# Patient Record
Sex: FEMALE | Race: WHITE | NOT HISPANIC OR LATINO | Employment: FULL TIME | URBAN - METROPOLITAN AREA
[De-identification: names, ages, dates, MRNs, and addresses within clinical notes are randomized per-mention and may not be internally consistent; named-entity substitution may affect disease eponyms.]

---

## 2018-09-06 ENCOUNTER — TRANSCRIBE ORDERS (OUTPATIENT)
Dept: ADMINISTRATIVE | Facility: HOSPITAL | Age: 23
End: 2018-09-06

## 2018-09-06 DIAGNOSIS — R51.9 NONINTRACTABLE HEADACHE, UNSPECIFIED CHRONICITY PATTERN, UNSPECIFIED HEADACHE TYPE: Primary | ICD-10-CM

## 2018-10-12 ENCOUNTER — HOSPITAL ENCOUNTER (OUTPATIENT)
Dept: GENERAL RADIOLOGY | Facility: HOSPITAL | Age: 23
Discharge: HOME | End: 2018-10-12

## 2018-10-12 ENCOUNTER — HOSPITAL ENCOUNTER (OUTPATIENT)
Dept: MRI IMAGING | Facility: HOSPITAL | Age: 23
Discharge: HOME | End: 2018-10-12
Admitting: NURSE PRACTITIONER

## 2018-10-12 VITALS
WEIGHT: 275 LBS | TEMPERATURE: 98.1 F | RESPIRATION RATE: 18 BRPM | BODY MASS INDEX: 43.16 KG/M2 | OXYGEN SATURATION: 100 % | DIASTOLIC BLOOD PRESSURE: 79 MMHG | HEIGHT: 67 IN | HEART RATE: 82 BPM | SYSTOLIC BLOOD PRESSURE: 126 MMHG

## 2018-10-12 DIAGNOSIS — R51.9 NONINTRACTABLE HEADACHE, UNSPECIFIED CHRONICITY PATTERN, UNSPECIFIED HEADACHE TYPE: ICD-10-CM

## 2018-10-12 LAB
APPEARANCE CSF: CLEAR
B-HCG UR QL: NEGATIVE
COLOR CSF: COLORLESS
GLUCOSE CSF-MCNC: 55 MG/DL (ref 40–70)
PROT CSF-MCNC: 34 MG/DL (ref 15–45)
RBC # CSF MANUAL: 1 /MM3 (ref 0–5)
SPECIMEN VOL CSF: 8.5 ML
TUBE # CSF: 3
WBC # CSF MANUAL: 0 /MM3 (ref 0–5)

## 2018-10-12 PROCEDURE — 84157 ASSAY OF PROTEIN OTHER: CPT | Performed by: NURSE PRACTITIONER

## 2018-10-12 PROCEDURE — 81025 URINE PREGNANCY TEST: CPT | Performed by: PHYSICIAN ASSISTANT

## 2018-10-12 PROCEDURE — A9577 INJ MULTIHANCE: HCPCS | Performed by: NURSE PRACTITIONER

## 2018-10-12 PROCEDURE — 89050 BODY FLUID CELL COUNT: CPT | Performed by: NURSE PRACTITIONER

## 2018-10-12 PROCEDURE — 87205 SMEAR GRAM STAIN: CPT | Performed by: NURSE PRACTITIONER

## 2018-10-12 PROCEDURE — 77003 FLUOROGUIDE FOR SPINE INJECT: CPT

## 2018-10-12 PROCEDURE — 70553 MRI BRAIN STEM W/O & W/DYE: CPT

## 2018-10-12 PROCEDURE — 87015 SPECIMEN INFECT AGNT CONCNTJ: CPT | Performed by: NURSE PRACTITIONER

## 2018-10-12 PROCEDURE — 87070 CULTURE OTHR SPECIMN AEROBIC: CPT | Performed by: NURSE PRACTITIONER

## 2018-10-12 PROCEDURE — 0 GADOBENATE DIMEGLUMINE 529 MG/ML SOLUTION: Performed by: NURSE PRACTITIONER

## 2018-10-12 PROCEDURE — 82945 GLUCOSE OTHER FLUID: CPT | Performed by: NURSE PRACTITIONER

## 2018-10-12 RX ORDER — LIDOCAINE HYDROCHLORIDE 10 MG/ML
5 INJECTION, SOLUTION INFILTRATION; PERINEURAL ONCE
Status: COMPLETED | OUTPATIENT
Start: 2018-10-12 | End: 2018-10-12

## 2018-10-12 RX ADMIN — LIDOCAINE HYDROCHLORIDE 5 ML: 10 INJECTION, SOLUTION INFILTRATION; PERINEURAL at 11:46

## 2018-10-12 RX ADMIN — GADOBENATE DIMEGLUMINE 20 ML: 529 INJECTION, SOLUTION INTRAVENOUS at 08:11

## 2018-10-12 NOTE — NURSING NOTE
Pt discharged to home s/p LP.  Pt tolerated procedure without complications.  Discharge instructions reviewed with patient and boyfriend both verbalized understanding.  Pt transported to exit via wheelchair per CNA.

## 2018-10-12 NOTE — POST-PROCEDURE NOTE
Radiology Procedure    Pre-procedure: procedure, risks discussed with patient. Patient indicated understanding and consented to procedure.     Procedure Performed: lumbar puncture     IV Sedation and/or Anesthesia:  No    Complications: none    Preliminary Findings: opening pressure 15cm H2O    Specimen Removed: 8cc clear, colorless CSF    Estimated Blood Loss:  0ml    Post-Procedure Diagnosis: pending    Post-Procedure Plan: encourage fluids, bed rest x 2 hours    Standard Discharge Instructions Given:yes     EFRAIN Newsome  10/12/18  11:40 AM

## 2018-10-15 ENCOUNTER — TELEPHONE (OUTPATIENT)
Dept: INFUSION THERAPY | Facility: HOSPITAL | Age: 23
End: 2018-10-15

## 2018-10-18 ENCOUNTER — HOSPITAL ENCOUNTER (EMERGENCY)
Facility: HOSPITAL | Age: 23
Discharge: HOME | End: 2018-10-18
Attending: EMERGENCY MEDICINE | Admitting: EMERGENCY MEDICINE

## 2018-10-18 VITALS
SYSTOLIC BLOOD PRESSURE: 115 MMHG | HEIGHT: 67 IN | HEART RATE: 89 BPM | TEMPERATURE: 98.1 F | RESPIRATION RATE: 16 BRPM | DIASTOLIC BLOOD PRESSURE: 77 MMHG | BODY MASS INDEX: 40.81 KG/M2 | WEIGHT: 260 LBS | OXYGEN SATURATION: 100 %

## 2018-10-18 DIAGNOSIS — R51.9 NONINTRACTABLE HEADACHE, UNSPECIFIED CHRONICITY PATTERN, UNSPECIFIED HEADACHE TYPE: Primary | ICD-10-CM

## 2018-10-18 DIAGNOSIS — Z98.890: ICD-10-CM

## 2018-10-18 LAB
ALBUMIN SERPL-MCNC: 4.41 G/DL (ref 3.2–4.8)
ALBUMIN/GLOB SERPL: 1.4 G/DL (ref 1.5–2.5)
ALP SERPL-CCNC: 103 U/L (ref 25–100)
ALT SERPL W P-5'-P-CCNC: 16 U/L (ref 7–40)
ANION GAP SERPL CALCULATED.3IONS-SCNC: 8 MMOL/L (ref 3–11)
AST SERPL-CCNC: 21 U/L (ref 0–33)
BASOPHILS # BLD AUTO: 0.04 10*3/MM3 (ref 0–0.2)
BASOPHILS NFR BLD AUTO: 0.4 % (ref 0–1)
BILIRUB SERPL-MCNC: 0.7 MG/DL (ref 0.3–1.2)
BUN BLD-MCNC: 9 MG/DL (ref 9–23)
BUN/CREAT SERPL: 10.6 (ref 7–25)
CALCIUM SPEC-SCNC: 9.2 MG/DL (ref 8.7–10.4)
CHLORIDE SERPL-SCNC: 107 MMOL/L (ref 99–109)
CO2 SERPL-SCNC: 19 MMOL/L (ref 20–31)
CREAT BLD-MCNC: 0.85 MG/DL (ref 0.6–1.3)
DEPRECATED RDW RBC AUTO: 37.9 FL (ref 37–54)
EOSINOPHIL # BLD AUTO: 0.3 10*3/MM3 (ref 0–0.3)
EOSINOPHIL NFR BLD AUTO: 2.7 % (ref 0–3)
ERYTHROCYTE [DISTWIDTH] IN BLOOD BY AUTOMATED COUNT: 12.6 % (ref 11.3–14.5)
GFR SERPL CREATININE-BSD FRML MDRD: 84 ML/MIN/1.73
GLOBULIN UR ELPH-MCNC: 3.1 GM/DL
GLUCOSE BLD-MCNC: 79 MG/DL (ref 70–100)
HCT VFR BLD AUTO: 45.5 % (ref 34.5–44)
HGB BLD-MCNC: 15 G/DL (ref 11.5–15.5)
IMM GRANULOCYTES # BLD: 0.03 10*3/MM3 (ref 0–0.03)
IMM GRANULOCYTES NFR BLD: 0.3 % (ref 0–0.6)
LYMPHOCYTES # BLD AUTO: 2.92 10*3/MM3 (ref 0.6–4.8)
LYMPHOCYTES NFR BLD AUTO: 26.6 % (ref 24–44)
MCH RBC QN AUTO: 27.7 PG (ref 27–31)
MCHC RBC AUTO-ENTMCNC: 33 G/DL (ref 32–36)
MCV RBC AUTO: 84.1 FL (ref 80–99)
MONOCYTES # BLD AUTO: 0.66 10*3/MM3 (ref 0–1)
MONOCYTES NFR BLD AUTO: 6 % (ref 0–12)
NEUTROPHILS # BLD AUTO: 7.04 10*3/MM3 (ref 1.5–8.3)
NEUTROPHILS NFR BLD AUTO: 64.3 % (ref 41–71)
PLATELET # BLD AUTO: 280 10*3/MM3 (ref 150–450)
PMV BLD AUTO: 11 FL (ref 6–12)
POTASSIUM BLD-SCNC: 3.8 MMOL/L (ref 3.5–5.5)
PROT SERPL-MCNC: 7.5 G/DL (ref 5.7–8.2)
RBC # BLD AUTO: 5.41 10*6/MM3 (ref 3.89–5.14)
SODIUM BLD-SCNC: 134 MMOL/L (ref 132–146)
WBC NRBC COR # BLD: 10.96 10*3/MM3 (ref 4.5–13.5)

## 2018-10-18 PROCEDURE — 25010000002 ONDANSETRON PER 1 MG: Performed by: EMERGENCY MEDICINE

## 2018-10-18 PROCEDURE — 25010000002 DEXAMETHASONE: Performed by: EMERGENCY MEDICINE

## 2018-10-18 PROCEDURE — 96375 TX/PRO/DX INJ NEW DRUG ADDON: CPT

## 2018-10-18 PROCEDURE — 96365 THER/PROPH/DIAG IV INF INIT: CPT

## 2018-10-18 PROCEDURE — 85025 COMPLETE CBC W/AUTO DIFF WBC: CPT | Performed by: EMERGENCY MEDICINE

## 2018-10-18 PROCEDURE — 99284 EMERGENCY DEPT VISIT MOD MDM: CPT

## 2018-10-18 PROCEDURE — 80053 COMPREHEN METABOLIC PANEL: CPT | Performed by: EMERGENCY MEDICINE

## 2018-10-18 RX ORDER — ACETAZOLAMIDE 250 MG/1
500 TABLET ORAL 2 TIMES DAILY
COMMUNITY

## 2018-10-18 RX ORDER — ONDANSETRON 2 MG/ML
4 INJECTION INTRAMUSCULAR; INTRAVENOUS ONCE
Status: COMPLETED | OUTPATIENT
Start: 2018-10-18 | End: 2018-10-18

## 2018-10-18 RX ORDER — ACETAZOLAMIDE 250 MG/1
500 TABLET ORAL ONCE
Status: COMPLETED | OUTPATIENT
Start: 2018-10-18 | End: 2018-10-18

## 2018-10-18 RX ORDER — ONDANSETRON 2 MG/ML
4 INJECTION INTRAMUSCULAR; INTRAVENOUS
Status: DISCONTINUED | OUTPATIENT
Start: 2018-10-18 | End: 2018-10-18 | Stop reason: HOSPADM

## 2018-10-18 RX ADMIN — ACETAZOLAMIDE 500 MG: 250 TABLET ORAL at 20:16

## 2018-10-18 RX ADMIN — DEXAMETHASONE SODIUM PHOSPHATE 10 MG: 10 INJECTION INTRAMUSCULAR; INTRAVENOUS at 18:53

## 2018-10-18 RX ADMIN — SODIUM CHLORIDE 500 ML: 9 INJECTION, SOLUTION INTRAVENOUS at 18:50

## 2018-10-18 RX ADMIN — ONDANSETRON HYDROCHLORIDE 4 MG: 2 INJECTION, SOLUTION INTRAMUSCULAR; INTRAVENOUS at 18:49

## 2018-10-18 NOTE — ED PROVIDER NOTES
[facility] EMERGENCY DEPARTMENT    eMERGENCY dEPARTMENT eNCOUnter      Pt Name: Papa Valero  MRN: [id number]  YOB: 1995  Date of evaluation: 10/18/2018  Provider: Maksim Quintero DO    CHIEF COMPLAINT       Chief Complaint   Patient presents with   • Headache         HISTORY OF PRESENT ILLNESS  (Location/Symptom, Timing/Onset, Context/Setting, Quality, Duration, Modifying Factors, Severity.)   Papa Valero is a 23 y.o. female who presents to the emergency department for evaluation of a headache which she rates has been on and off for last couple weeks.  Last Friday she was evaluated for her headaches and vision changes, all totally found to have swelling of her optic disks, concern for increased intracranial pressure.  That time she did undergo imaging including MRI of the brain in addition to lumbar puncture.  She is initially her symptoms did improve, headache lessened the next day but returned the following morning, states it was a gradual onset, mainly located in the left parietal temporal region.  Denies any vision changes or vision loss.  No fevers or chills, no neck pain or stiffness.  She has been in discussion with her neurologist Dr. Schulte yesterday he started her on Diamox.  She continues with her symptoms told to come the hospital for reevaluation.  Denies any numbness or tingling in her upper extremities, negative unilateral weakness.  No prior history of lumbar punctures.  No seizure activity.  No other acute systemic complaints.      Nursing notes were reviewed.    REVIEW OF SYSTEMS    (2-9 systems for level 4, 10 or more for level 5)   ROS:  General:  No fevers, no chills, no weakness  Cardiovascular:  No chest pain, no palpitations  Respiratory:  No shortness of breath, no cough, no wheezing  Gastrointestinal:  No pain, no nausea, no vomiting, no diarrhea  Musculoskeletal:  No muscle pain, no joint pain  Skin:  No rash, no easy bruising  Neurologic:  No speech  problems, + headache, no extremity numbness, no extremity tingling, no extremity weakness  Psychiatric:  No anxiety  Genitourinary:  No dysuria, no hematuria    Except as noted above the remainder of the review of systems was reviewed and negative.       PAST MEDICAL HISTORY     Past Medical History:   Diagnosis Date   • Asthma    • History of IBS    • Hypertension     when younger   • Tachycardia          SURGICAL HISTORY     History reviewed. No pertinent surgical history.      CURRENT MEDICATIONS       Current Facility-Administered Medications:   •  acetaZOLAMIDE (DIAMOX) tablet 500 mg, 500 mg, Oral, Once, Maksim Quintero DO  •  ondansetron (ZOFRAN) injection 4 mg, 4 mg, Intravenous, Q30 Min PRN, Maksim Quintero DO    Current Outpatient Prescriptions:   •  acetaZOLAMIDE (DIAMOX) 250 MG tablet, Take 500 mg by mouth 2 (Two) Times a Day., Disp: , Rfl:     ALLERGIES     Shellfish-derived products and Keflex [cephalexin]    FAMILY HISTORY     History reviewed. No pertinent family history.       SOCIAL HISTORY       Social History     Social History   • Marital status: Single     Social History Main Topics   • Smoking status: Never Smoker   • Alcohol use No   • Drug use: No   • Sexual activity: Yes     Birth control/ protection: IUD     Other Topics Concern   • Not on file         PHYSICAL EXAM    (up to 7 for level 4, 8 or more for level 5)     Vitals:    10/18/18 1814 10/18/18 1830 10/18/18 1900 10/18/18 1913   BP:  118/83 115/82    BP Location:       Patient Position:       Pulse:       Resp:       Temp:       TempSrc:       SpO2: 96% 98% 99% 100%   Weight:       Height:           Physical Exam  General :Patient is awake, alert, oriented, in no acute distress, nontoxic appearing  HEENT: Pupils are equally round and reactive to light, EOMI, conjunctivae clear, sclerae white, there is no injection no icterus.  Oral mucosa is moist, no exudate. Uvula is midline  Neck: Neck is supple, full range of motion,  trachea midline  Cardiac: Heart regular rate, rhythm, no murmurs, rubs, or gallops  Lungs: Lungs are clear to auscultation, there is no wheezing, rhonchi, or rales. There is no use of accessory muscles.  Chest wall: There is no tenderness to palpation over the chest wall or over ribs  Abdomen: Abdomen is soft, nontender, nondistended. There is no firm or pulsatile masses, no rebound rigidity or guarding.   Musculoskeletal: 5 out of 5 strength in all 4 extremities.  No focal muscle deficits are appreciated  Neuro: GCS 15 of focal neurological deficit, normal finger-to-nose, good  strength, no pronator drift.  Normal sensation upper and lower extremities.  Good strength in bilateral lowers.  Motor intact, sensory intact, level of consciousness is normal, cerebellar function is normal, reflexes are grossly normal.   Dermatology: Skin is warm and dry  Psych: Mentation is grossly normal, cognition is grossly normal. Affect is appropriate.      DIAGNOSTIC RESULTS     EKG: All EKG's are interpreted by the Emergency Department Physician who either signs or Co-signs this chart in the absence of a cardiologist.    No orders to display       RADIOLOGY:   Non-plain film images such as CT, Ultrasound and MRI are read by the radiologist. Plain radiographic images are visualized and preliminarily interpreted by the emergency physician with the below findings:      [] Radiologist's Report Reviewed:  No orders to display    MRI Brain With & Without Contrast [SDS692] (Order [id number])   Order   Status: Final result   Patient Location     Patient Class Location Department Phone   Emergency [facility] EMERGENCY DEPT, 05, 05 [phone]   Study Notes        Demetrio Gomez on 10/12/2018  8:10 AM   Headaches , optic nerve swelling left eye ,vision changes ,increased pressure feeling in head   20 ml multihance      Appointment Information     Display Notes     --BC           PACS Images     Radiology Images   Study Result      EXAMINATION: MRI BRAIN W WO CONTRAST- 10/12/2018     INDICATION: R51-Headache      TECHNIQUE: Multiplanar MRI of the brain with and without intravenous  contrast administration     COMPARISON: NONE     FINDINGS: No restriction on diffusion-weighted sequences. Midline  structures are symmetric without evidence of mass, mass effect or  midline shift. Pituitary and sella within normal limits for patient age  with slightly upper convex margin however grossly normal in size.  Cervicomedullary junction widely patent. No significant white matter  signal aberration within the periventricular and deep white matter.  Basal cisterns are patent.     Left globe demonstrate a questionably flattened/blunted appearance  posteriorly near the optic disc seen on T2 sequences along with edema of  the left optic nerve however no discrete abnormal enhancement  associated. Ventricles and sulci are within normal limits for patient  age.  Paranasal sinuses demonstrate mucus retention cyst within the left  maxillary sinus as well as mucosal thickening and mild edema right  sphenoid sinus.     Postcontrast administration sequences without abnormal enhancement of  the supratentorial white matter. Superior sagittal sinus widely patent.     IMPRESSION:  Edema of the left optic nerve and surrounding perineural fat  with questionable blunting of the left globe posterior margin at the  optic disc left greater than right which may represent components of  optic nerve edema, neuritis and/or increased intracranial pressures. If  symptoms persist and/or progress lumbar puncture with opening pressure  may be useful for further evaluation.      Minimal mucosal thickening and edema of the right sphenoid sinus may  represent component of sinusitis.     D:  10/12/2018  E:  10/12/2018     This report was finalized on 10/12/2018 3:20 PM by Dr. Richar Chadwick.   IR Lumbar Puncture Diagnosis [[id number]] (Order [id number])   Order   Status: Final result   Patient  Location     Patient Class Location Department Phone   Emergency [facility] EMERGENCY DEPT, 05, 05 [phone]   Study Notes        Bonny Rolle RT on 10/12/2018 11:45 AM   headache      Appointment Information     Display Notes     ~           PACS Images     Radiology Images   Study Result     EXAMINATION: IR LUMBAR PUNCTURE DIAG/THERA-     INDICATION: R51 HEADACHE; R51-Headache     TECHNIQUE: 4 seconds of fluoroscopic time was used for this procedure. 1  associated image was saved. Procedure, risks, complications, and side  effects of lumbar puncture were discussed and reviewed in detail with  patient including the possibility for eating, infection, needle damage  to surrounding structures, the possibility of a CSF leak potentially  requiring a blood patch. The patient indicated understanding and  consented to the procedure.     The patient was placed in the prone position on the table. The back was  prepped and draped in a sterile fashion. 1% lidocaine was used as a  local anesthetic to the skin and subcutaneous tissues. Under  fluoroscopic guidance a 22-gauge spinal needle was advanced at the L2-L3  level to the CSF space. Opening pressure was measured and recorded at 15  cm H2O approximately 8 cc of clear and colorless CSF was returned and  collected in 4 numbered vials. Sample vials were labeled and sent to  pathology for further analysis. The needle was removed. The patient  tolerated the procedure well, and no immediate complications occurred.     COMPARISON: NONE     FINDINGS: Opening pressure was 15 cm H2O.        IMPRESSION:  Successful fluoroscopic guided lumbar puncture as above with  no immediate complications. Opening pressure was 15 cm H2O.         This report was finalized on 10/12/2018 3:20 PM by Dr. Richar Chadwick.             ED BEDSIDE ULTRASOUND:   Performed by ED Physician - none    LABS:  Cell Count, CSF - Cerebrospinal Fluid, Lumbar Puncture   Order: [id number] - Part of Panel Order  [id number]   Status:  Final result   Visible to patient:  No (Not Released)    Ref Range & Units 6d ago   WBC, CSF 0 - 5 /mm3 0    RBC, CSF 0 - 5 /mm3 1    Color, CSF Colorless Colorless    Appearance, CSF Clear Clear    Volume, CSF mL 8.5    Tube Number, CSF  3    Resulting Agency  [facility]   Narrative     Differential not indicated.      Specimen Collected: 10/12/18 11:30 Last Resulted: 10/12/18 12:07                  I have reviewed and interpreted all of the currently available lab results from this visit (if applicable):  Results for orders placed or performed during the hospital encounter of 10/18/18   Comprehensive Metabolic Panel   Result Value Ref Range    Glucose 79 70 - 100 mg/dL    BUN 9 9 - 23 mg/dL    Creatinine 0.85 0.60 - 1.30 mg/dL    Sodium 134 132 - 146 mmol/L    Potassium 3.8 3.5 - 5.5 mmol/L    Chloride 107 99 - 109 mmol/L    CO2 19.0 (L) 20.0 - 31.0 mmol/L    Calcium 9.2 8.7 - 10.4 mg/dL    Total Protein 7.5 5.7 - 8.2 g/dL    Albumin 4.41 3.20 - 4.80 g/dL    ALT (SGPT) 16 7 - 40 U/L    AST (SGOT) 21 0 - 33 U/L    Alkaline Phosphatase 103 (H) 25 - 100 U/L    Total Bilirubin 0.7 0.3 - 1.2 mg/dL    eGFR Non African Amer 84 >60 mL/min/1.73    Globulin 3.1 gm/dL    A/G Ratio 1.4 (L) 1.5 - 2.5 g/dL    BUN/Creatinine Ratio 10.6 7.0 - 25.0    Anion Gap 8.0 3.0 - 11.0 mmol/L   CBC Auto Differential   Result Value Ref Range    WBC 10.96 4.50 - 13.50 10*3/mm3    RBC 5.41 (H) 3.89 - 5.14 10*6/mm3    Hemoglobin 15.0 11.5 - 15.5 g/dL    Hematocrit 45.5 (H) 34.5 - 44.0 %    MCV 84.1 80.0 - 99.0 fL    MCH 27.7 27.0 - 31.0 pg    MCHC 33.0 32.0 - 36.0 g/dL    RDW 12.6 11.3 - 14.5 %    RDW-SD 37.9 37.0 - 54.0 fl    MPV 11.0 6.0 - 12.0 fL    Platelets 280 150 - 450 10*3/mm3    Neutrophil % 64.3 41.0 - 71.0 %    Lymphocyte % 26.6 24.0 - 44.0 %    Monocyte % 6.0 0.0 - 12.0 %    Eosinophil % 2.7 0.0 - 3.0 %    Basophil % 0.4 0.0 - 1.0 %    Immature Grans % 0.3 0.0 - 0.6 %    Neutrophils, Absolute 7.04 1.50 - 8.30  10*3/mm3    Lymphocytes, Absolute 2.92 0.60 - 4.80 10*3/mm3    Monocytes, Absolute 0.66 0.00 - 1.00 10*3/mm3    Eosinophils, Absolute 0.30 0.00 - 0.30 10*3/mm3    Basophils, Absolute 0.04 0.00 - 0.20 10*3/mm3    Immature Grans, Absolute 0.03 0.00 - 0.03 10*3/mm3        All other labs were within normal range or not returned as of this dictation.      EMERGENCY DEPARTMENT COURSE and DIFFERENTIAL DIAGNOSIS/MDM:   Vitals:    Vitals:    10/18/18 1814 10/18/18 1830 10/18/18 1900 10/18/18 1913   BP:  118/83 115/82    BP Location:       Patient Position:       Pulse:       Resp:       Temp:       TempSrc:       SpO2: 96% 98% 99% 100%   Weight:       Height:              Patient with continued headache since her recent lumbar puncture, was diagnosed with optic nerve swelling, had her MRI and lumbar puncture completed without significant acute findings, opening pressure 15mmHg.  Patient does not appear acutely ill or toxic, no focal neurological deficits on examination, GCS 15.  Did review her MRI, lumbar puncture as well as CSF studies.  On arrival she does have a blood pressure 98/64 discussed getting her small fluid bolus, in addition to Decadron, Zofran.  Recheck blood pressure 115/82.  A reexamination patient is feeling better to Decadron and IV fluids.  We did attempt to call her specialist at [facility], but did not have a call service.  I discussed with the patient she should continue with her Diamox, fluid hydration, caffeine use, laying flat for a few days, keep a close watch in her symptoms with her PCP as well as neurology specialist.  If she has any fevers, neck pain or worsening symptoms she should return to hospital for reevaluation.  Patient understands to return precautions discussed. The patient will follow-up with their PCP in 1-2 days for reevaluation.  If the patient or family members have any further concerns or patient has any worsening symptoms they will return to the ED for  reevaluation.      MEDICATIONS ADMINISTERED IN ED:  Medications   ondansetron (ZOFRAN) injection 4 mg (not administered)   acetaZOLAMIDE (DIAMOX) tablet 500 mg (not administered)   ondansetron (ZOFRAN) injection 4 mg (4 mg Intravenous Given 10/18/18 1849)   sodium chloride 0.9 % bolus 500 mL (0 mL Intravenous Stopped 10/18/18 1921)   dexamethasone (DECADRON) IVPB 10 mg (0 mg Intravenous Stopped 10/18/18 1921)       PROCEDURES:  Procedures    CRITICAL CARE TIME    Total Critical Care time was 0 minutes, excluding separately reportable procedures.   There was a high probability of clinically significant/life threatening deterioration in the patient's condition which required my urgent intervention.      FINAL IMPRESSION      1. Nonintractable headache, unspecified chronicity pattern, unspecified headache type    2. Lumbar puncture within last 10 days          DISPOSITION/PLAN     ED Disposition     ED Disposition Condition Comment    Discharge Stable           PATIENT REFERRED TO:  CINTHIA HERBERT, CHRISTA  [phone]  [email or web address]"    [facility]   [address]   [place] [address]  Schedule an appointment as soon as possible for a visit in 1 day      Your Primary Care Provider    Schedule an appointment as soon as possible for a visit in 2 days      [facility] Department  [address] [address] [phone]    If symptoms worsen      DISCHARGE MEDICATIONS:     Medication List      CONTINUE taking these medications    acetaZOLAMIDE 250 MG tablet  Commonly known as:  DIAMOX          Comment: Please note this report has been produced using speech recognition software and may contain errors related to that system including errors in grammar, punctuation, and spelling, as well as words and phrases that may be inappropriate. If there are any questions or concerns please feel free to contact the  dictating provider for clarification.    Maksim Quintero DO  Attending Emergency Physician               Maksim Quintero,   10/18/18 2005

## 2018-10-19 LAB
BACTERIA SPEC AEROBE CULT: NORMAL
GRAM STN SPEC: NORMAL